# Patient Record
(demographics unavailable — no encounter records)

---

## 2025-05-29 NOTE — ASSESSMENT
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    We discussed the following options for managing the patient's lower urinary tract symptoms (LUTS) due to BPH:  (1) Behavioral modification: timed and double voiding, reduced oral fluid intake at night, avoid bladder irritants (caffeine, alcohol, smoking, spicy foods)  (2) Medical therapy: medication classes include alpha-blockers, 5-alpha reductase inhibitors, anticholinergics, PDE-5 inhibitors (Tadalafil is approved for co-treatment of BPH and erectile dysfunction), and combination therapy  (3) Surgical intervention: TURP (transurethral resection of the prostate), UroLIFT (prostatic urethral lift), Rezum (water vapor ablation of prostate), PVP (photovaporization of prostate), enucleation of the prostate, and simple prostatectomy   We also discussed the indications, mechanism of action, and potential side effects of potential medications, as well as the details, risks, and benefits of the relevant surgical procedures. Based on his risk factors, clinical condition, and personal preferences, the most applicable and appropriate option would be Flomax. The side effects of Tamsulosin were discussed in detail. These include but are not limited to hives, changes in vision, orthostatic hypotension, dizziness, congestion, and retrograde ejaculation. Additionally, the patient should notify his ophthalmologist prior to any surgery for cataracts given the risk of floppy iris syndrome.  Discussed family history - history of Cleary - discussed associations with prostate and urothelial cancer. Will send referral to genetics. Will check PSA today. Will send urine studies including cytology.   Plan:  - genetics referral - UA culture - PSA today - cytology - start Flomax - fu 6-12 weeks - uroflow/PVR  I am the primary provider managing this condition and will be seeing the patient longitudinally.

## 2025-05-29 NOTE — HISTORY OF PRESENT ILLNESS
[FreeTextEntry1] : Name HAVEN HANEY  MRN [id number]   Oct  9 1959  Contact Number: [phone] ----------------------------------------------------------------------------------------------------------------------------------------- Date of First Visit: 25 Referring Provider/PCP: in between ----------------------------------------------------------------------------------------------------------------------------------------- CC: Bothersome lower urinary tract symptoms (LUTS)   History of Present Illness: HAVEN HANEY is a 65 year male with Cleary Syndrome reports stream weaker over past few years. At times will have intense urgency - maybe once a day. Reports frequency 3-4x/ week and will need to go every 1.5 hours. Doesn't seem related to fluid intake. Nocturia 1x. Feels like fully emptying. No straining. No hematuria. Reports had 1 UTI 35 years ago - no issues since. No prior medications for urination.  Patient reports last PSA was about 1 year ago and 0.5. Patient reports family history of CHEK2 mutation and Cleary Syndrome. Grandfather and father passed from prostate cancer. Brother treated for prostate cancer age 71 and has CHEK 2 mutation. Sister uterine cancer and found to have CHEK 2 and Celary. Patient had genetic tested CHEK 2 mutation negative. Reports gets yearly colonoscopy.   PVR (to ensure adequate emptying): 133  PMH: HTN, CAD (no stents), HLD, Cleary Syndrome PSH: lumbar surgery Family History: Grandfather and father passed from prostate cancer. Brother treated for prostate cancer age 71 and has CHEK 2 mutation. Sister uterine cancer and found to have CHEK 2 and Cleary. Patient had genetic tested CHEK 2 mutation negative. Social: single, 1 child, , ex-smoker age 20-45 1/3 ppd, social alcohol, edibles to help sleep Meds: ASA 81mg, Toprol XL 25mg, Lipitor 40mg, Lisinopril 5mg. Allergies: NKDA ROS: no fevers, chills, flank pain